# Patient Record
Sex: MALE | Race: WHITE
[De-identification: names, ages, dates, MRNs, and addresses within clinical notes are randomized per-mention and may not be internally consistent; named-entity substitution may affect disease eponyms.]

---

## 2018-07-16 ENCOUNTER — HOSPITAL ENCOUNTER (OUTPATIENT)
Dept: HOSPITAL 80 - FIMAGING | Age: 68
End: 2018-07-16
Attending: NURSE PRACTITIONER
Payer: COMMERCIAL

## 2018-07-16 DIAGNOSIS — N13.2: Primary | ICD-10-CM

## 2018-07-16 DIAGNOSIS — K76.89: ICD-10-CM

## 2018-07-16 DIAGNOSIS — K74.60: ICD-10-CM

## 2018-07-16 DIAGNOSIS — N20.0: ICD-10-CM

## 2018-07-16 PROCEDURE — 74178 CT ABD&PLV WO CNTR FLWD CNTR: CPT

## 2018-10-10 NOTE — GHP
DATE OF ADMISSION:  10/11/2018



ADMITTING DIAGNOSIS:  Right large ureteral calculus.



HISTORY OF PRESENT ILLNESS:  This is a 68-year-old gentleman who has had a large right ureteral calcu
prasanna noted on CAT scan with hydronephrosis, and the stone is 2.2 cm in longitudinal length.  At the pr
esent time, we discussed the various options of therapy and recommended that he undergo robotic urete
rolithotomy, and indications, complications, and risks were discussed.



PAST MEDICAL HISTORY:  He has had ureterolithiasis, hepatitis C, emphysema, alcoholism.



PAST SURGICAL HISTORY:  No surgeries identified.



MEDICATIONS:  Atrovent, ProAir.



ALLERGIES:  None.



FAMILY HISTORY:  Positive for kidney stones.



SOCIAL HISTORY:  Rare alcohol consumption.  Currently smokes daily.



REVIEW OF SYSTEMS:  Negative cardiac, respiratory, GI, and endocrine.



PHYSICAL EXAMINATION:  VITAL SIGNS:  Stable.  CHEST:  Clear.  HEART:  Regular rate and rhythm.  ABDOM
EN:  Normal.  No organomegaly, rebound, or guarding.  LOWER EXTREMITIES:  Normal.



ASSESSMENT AND PLAN:  At the present time, he is admitted for the right robotic-assisted ureterolitho
yudith.  We have asked that he have a cardiac clearance, and, by Dr. Mallory as of 08/17/2018, it says h
jonathan has no cardiac contraindication proceeding with necessary noncardiac surgery.  So plans are a right
-sided ureterolithotomy, robotically assisted.





Job #:  [id number]

## 2018-10-11 ENCOUNTER — HOSPITAL ENCOUNTER (INPATIENT)
Dept: HOSPITAL 80 - F1N | Age: 68
LOS: 4 days | Discharge: HOME | DRG: 661 | End: 2018-10-15
Attending: SPECIALIST | Admitting: SPECIALIST
Payer: COMMERCIAL

## 2018-10-11 DIAGNOSIS — Z72.0: ICD-10-CM

## 2018-10-11 DIAGNOSIS — N20.1: Primary | ICD-10-CM

## 2018-10-11 DIAGNOSIS — I10: ICD-10-CM

## 2018-10-11 DIAGNOSIS — K59.00: ICD-10-CM

## 2018-10-11 DIAGNOSIS — J43.9: ICD-10-CM

## 2018-10-11 DIAGNOSIS — F10.21: ICD-10-CM

## 2018-10-11 DIAGNOSIS — B19.20: ICD-10-CM

## 2018-10-11 PROCEDURE — C2625 STENT, NON-COR, TEM W/DEL SY: HCPCS

## 2018-10-11 RX ADMIN — OXYCODONE HYDROCHLORIDE PRN MG: 15 TABLET ORAL at 23:15

## 2018-10-11 RX ADMIN — OXYCODONE HYDROCHLORIDE PRN MG: 15 TABLET ORAL at 11:53

## 2018-10-11 RX ADMIN — DEXTROSE MONOHYDRATE AND SODIUM CHLORIDE SCH MLS: 5; .45 INJECTION, SOLUTION INTRAVENOUS at 21:14

## 2018-10-11 RX ADMIN — DEXTROSE MONOHYDRATE AND SODIUM CHLORIDE SCH MLS: 5; .45 INJECTION, SOLUTION INTRAVENOUS at 11:17

## 2018-10-11 RX ADMIN — ONDANSETRON PRN MG: 2 SOLUTION INTRAMUSCULAR; INTRAVENOUS at 13:08

## 2018-10-11 NOTE — POSTOPPROG
Post Op Note


Date of Operation: 10/11/18 (dictated)


Surgeon: Priyank Lundberg


Assistant: Elena


Anesthesiologist: Nick


Anesthesia: GET(General Endotracheal)


Pre-op Diagnosis: rt ureterolithiasis


Procedure: RA ureterolithotomy / stent


Inf/Abcess present in the surg proc area at time of surgery?: No


EBL: Minimal


Drains: Miguel Bailey, Other (ureteral stent)


Specimen(s): 





stone

## 2018-10-11 NOTE — PDANEPAE
ANE Past Medical History





- Cardiovascular History


Hx Hypertension: Yes


Hx Arrhythmias: No


Hx Chest Pain: No


Hx Coronary Artery / Peripheral Vascular Disease: Yes


Hx CHF / Valvular Disease: No


Hx Palpitations: No


Cardiovascular History Comment: borderline HTN - no current tx per cardiology





- Pulmonary History


Hx COPD: Yes


Hx Asthma/Reactive Airway Disease: No


Hx Recent Upper Respiratory Infection: No


Hx Oxygen in Use at Home: No


Hx Sleep Apnea: No


Sleep Apnea Screening Result - Last Documented: Negative


Pulmonary History Comment: emphysema





- Neurologic History


Hx Cerebrovascular Accident: No


Hx Seizures: No


Hx Dementia: No





- Endocrine History


Hx Diabetes: No





- Renal History


Hx Renal Disorders: Yes


Renal History Comment: renal stones





- Liver History


Hx Hepatic Disorders: Yes


Hepatic History Comment: hepatitis C, cirrhosis





- Neurological & Psychiatric Hx


Hx Neurological and Psychiatric Disorders: No





- Cancer History


Hx Cancer: No


Cancer History Comment: has had spots on lungs in the past but no diagnosis





- Congenital Disorder History


Hx Congenital Disorders: No





- GI History


Hx Gastrointestinal Disorders: No





- Other Health History


Other Health History: partial, top.  hx of alcoholism, sobor since 2014





- Chronic Pain History


Chronic Pain: No





- Surgical History


Prior Surgeries: none





ANE Review of Systems


Review of Systems: 








- Exercise capacity


METS (RN): 4 METS





ANE Patient History





- Allergies


Allergies/Adverse Reactions: 








No Known Allergies Allergy (Unverified 09/20/18 10:31)


 








- Home Medications


Home Medications: 








Albuterol [Proventil Inhaler HFA (*)] 1 - 2 puffs IH DAILY PRN 09/20/18 [Last 

Taken Unknown]


Herbals/Supplements -Info Only 1 ea PO DAILY 09/20/18 [Last Taken 10/04/18]


Ipratropium [Atrovent Hfa (*)] 2 puffs IH BID 09/20/18 [Last Taken 10/11/18 03:

30]


Multivitamins [Multivitamin (*)] 1 each PO DAILY 09/20/18 [Last Taken 10/04/18]








- NPO status


NPO Since - Liquids (Date): 10/10/18


NPO Since - Liquids (Time): 22:00


NPO Since - Solids (Date): 10/10/18


NPO Since - Solids (Time): 22:00





- Smoking Hx


Smoking Status: Current every day smoker





- Family Anes Hx


Family Hx Anesthesia Complications: none





ANE Labs/Vital Signs





- Vital Signs


Blood Pressure: 157/75


Heart Rate: 56


Respiratory Rate: 16


O2 Sat (%): 95


Height: 172.72 cm


Weight: 60.781 kg





ANE Physical Exam





- Airway


Neck exam: FROM


Mallampati Score: Class 1


Mouth exam: dentures





- Pulmonary


Pulmonary: no respiratory distress





- Cardiovascular


Cardiovascular: regular rate and rhythym





- ASA Status


ASA Status: III





ANE Anesthesia Plan


Anesthesia Plan: general endotracheal anesthesia

## 2018-10-11 NOTE — GOP
DATE OF OPERATION:  10/11/2018



SURGEON:  Priyank Lundberg MD



ASSISTANT:  Justyna Parker CFA



PREOPERATIVE DIAGNOSIS:  Right proximal ureteral calculus with obstruction.



POSTOPERATIVE DIAGNOSIS:  Right proximal ureteral calculus with obstruction.



PROCEDURE PERFORMED:  Robotic-assisted ureteral lithotomy and stent placement.



FINDINGS:  



SPECIMENS:  Stone.



ESTIMATED BLOOD LOSS:  Less than 10 mL.



DESCRIPTION OF PROCEDURE:  After undergoing general anesthesia, being prepped and draped in normal st
erile fashion in the left lateral decubitus position, appropriate time-out, appropriate identificatio
n of the stone and review of the CAT scan intraoperatively, a Veress needle was placed supraumbilical
ly and inflated to 15 mmHg pressure CO2 was utilized.  Then at that point, the 12 camera port was quintin
esther supraumbilically under direct vision and then two 8 mm robot arm ports, and a 12 mm assistant por
t were placed.  Then with appropriate instrumentation, the right colon was taken down.  Hemostasis vi
a bipolar electrocautery.  We then identified the ureter and dissected it out.  The gonadal vessel cr
ossed anterior to the ureter, and above that crossing vessel, I could identify the stone in the urete
r and that was dissected up to just above the level of the inferior pole of the kidney.  Ureterotomy 
was made.  The stone delivered out.  A stent, 4.7 multi-length was placed and it felt like it curled 
in the bladder and it curled in the kidney.  Then the ureter ureterotomy was approximated with 4-0 Vi
cryl suture.  It appeared to be water tight.  Reperitonealized the colon after placing a drain.  Then
 the 12 mm ports were closed with a fascial sutures with the closure device and 0 Vicryl.  The skin w
as approximated with 4-0 Monocryl and a local field injection was placed.



COMPLICATIONS:  None.



DISPOSITION:  He will be admitted for postoperative care.





Job #:  [id number]

## 2018-10-12 RX ADMIN — DEXTROSE MONOHYDRATE AND SODIUM CHLORIDE SCH MLS: 5; .45 INJECTION, SOLUTION INTRAVENOUS at 16:48

## 2018-10-12 RX ADMIN — ONDANSETRON PRN MG: 2 SOLUTION INTRAMUSCULAR; INTRAVENOUS at 19:45

## 2018-10-12 RX ADMIN — OXYCODONE HYDROCHLORIDE PRN MG: 15 TABLET ORAL at 19:51

## 2018-10-12 RX ADMIN — ACETAMINOPHEN PRN MG: 325 TABLET ORAL at 21:44

## 2018-10-12 RX ADMIN — ZOLPIDEM TARTRATE PRN MG: 5 TABLET ORAL at 21:43

## 2018-10-12 NOTE — SOAPPROG
SOAP Progress Note


Assessment/Plan: 


Assessment:








post robotic ureterolithiasis

















Plan:


Patient has fatigue and not ready for discharge. Some abdominal discharge and 

recommend he ambulate.  Discussed with RN. 


10/12/18 16:13





Subjective: 





fatigue and pain


Objective: 





 Vital Signs











Temp Pulse Resp BP Pulse Ox


 


 37.4 C   66   14   163/84 H  90 L


 


 10/12/18 15:02  10/12/18 15:02  10/12/18 15:02  10/12/18 15:02  10/12/18 15:02








 











 10/11/18 10/12/18 10/13/18





 05:59 05:59 05:59


 


Intake Total  12144 


 


Output Total  2188 160


 


Balance  02317 -160














Physical Exam





- Physical Exam


General Appearance: alert, no apparent distress


Respiratory: normal breath sounds


Cardiac/Chest: regular rate, rhythm


Abdomen: distended (bronwyn draining moderately bloody fluid)





ICD10 Worksheet


Patient Problems: 


 Problems











Problem Status Onset


 


Ureterolithiasis Acute  














- ICD10 Problem Qualifiers


(1) Ureterolithiasis

## 2018-10-12 NOTE — ASMTCMCOM
CM Note

 

CM Note                       

Notes:

Pt is a 67 y/o male who has a large right ureteral calculus noted on CAT scan with 

hydronephrosis.  Today he had a ureterolithomy right.  Pt has no identified CM needs; CM will 

follow for changes.



D/C Plan:  Anticipate independent.

 

Date Signed:  10/12/2018 03:10 PM

Electronically Signed By:Bhavna Foy

## 2018-10-12 NOTE — PDMN
Medical Necessity


Medical necessity: Change to IP, as of 10/12/18, per PA; los >2 mn s/p 

ureterolithiasis POD#1; pt having fatigue, pain & some abdominal discharge; 

requiring further monitoring & pain management

## 2018-10-13 LAB — PLATELET # BLD: 123 10^3/UL (ref 150–400)

## 2018-10-13 RX ADMIN — OXYCODONE HYDROCHLORIDE PRN MG: 15 TABLET ORAL at 17:07

## 2018-10-13 RX ADMIN — OXYCODONE HYDROCHLORIDE PRN MG: 15 TABLET ORAL at 21:13

## 2018-10-13 RX ADMIN — OXYCODONE HYDROCHLORIDE PRN MG: 15 TABLET ORAL at 08:10

## 2018-10-13 RX ADMIN — ACETAMINOPHEN PRN MG: 325 TABLET ORAL at 22:59

## 2018-10-13 RX ADMIN — OXYCODONE HYDROCHLORIDE PRN MG: 15 TABLET ORAL at 02:40

## 2018-10-13 RX ADMIN — OXYCODONE HYDROCHLORIDE PRN MG: 15 TABLET ORAL at 11:08

## 2018-10-13 NOTE — SOAPPROG
SOAP Progress Note


Assessment/Plan: 


Assessment: Ureterolithiasis   Acute POD # 2, assess labs and consider DC today

, labs ok








Plan: as noted








10/13/18 09:32





Subjective: 





constipated, and pain meds seem appropriate, pt refuses to ambulate even after 

discussion of risks of not ambulating


Objective: 





 Vital Signs











Temp Pulse Resp BP Pulse Ox


 


 37.1 C   72   15   140/80 H  95 


 


 10/13/18 08:00  10/13/18 08:00  10/13/18 08:00  10/13/18 08:00  10/13/18 08:00








 











 10/12/18 10/13/18 10/14/18





 05:59 05:59 05:59


 


Intake Total 22506 775 


 


Output Total 2186 3090 


 


Balance 21427 -2312 














Physical Exam





- Physical Exam


General Appearance: alert


EENT: other (pin point pupils from narcotics)


Respiratory: No respiratory distress


Cardiac/Chest: regular rate, rhythm


Abdomen: soft, distended, No hernia


Extremities: No calf tenderness, No Tayler's sign


Neuro/Psych: alert, oriented x 3, other (a bit too much narcotic)





ICD10 Worksheet


Patient Problems: 


 Problems











Problem Status Onset


 


Ureterolithiasis Acute

## 2018-10-14 RX ADMIN — ACETAMINOPHEN PRN MG: 325 TABLET ORAL at 09:04

## 2018-10-14 RX ADMIN — OXYCODONE HYDROCHLORIDE PRN MG: 15 TABLET ORAL at 02:25

## 2018-10-14 RX ADMIN — ACETAMINOPHEN PRN MG: 325 TABLET ORAL at 22:08

## 2018-10-14 NOTE — SOAPPROG
SOAP Progress Note


Assessment/Plan: 


Assessment: Ureterolithiasis   Acute POD # 3, assess labs and DC today, yet 

patient is reluctant to go due to no ride, consider SNF








Plan: as noted





10/14/18 09:34





Subjective: 





improving yet not having a ride for DC


Objective: 





 Vital Signs











Temp Pulse Resp BP Pulse Ox


 


 37.1 C   61   18   130/65 H  92 


 


 10/14/18 04:27  10/14/18 04:27  10/14/18 04:27  10/14/18 04:27  10/14/18 04:27








 Laboratory Results





 10/13/18 08:02 





 10/13/18 08:02 





 











 10/13/18 10/14/18 10/15/18





 05:59 05:59 05:59


 


Intake Total 775 2611 


 


Output Total 3090 1470 


 


Balance -2315 1141 














Physical Exam





- Physical Exam


General Appearance: alert


Neck: full range of motion


Respiratory: No respiratory distress


Cardiac/Chest: regular rate, rhythm


Abdomen: soft


Back: No CVA tenderness


Skin: warm/dry


Extremities: No calf tenderness, No Tayler's sign


Neuro/Psych: alert, oriented x 3





ICD10 Worksheet


Patient Problems: 


 Problems











Problem Status Onset


 


Ureterolithiasis Acute

## 2018-10-15 VITALS — DIASTOLIC BLOOD PRESSURE: 65 MMHG | SYSTOLIC BLOOD PRESSURE: 131 MMHG

## 2018-10-15 RX ADMIN — ZOLPIDEM TARTRATE PRN MG: 5 TABLET ORAL at 01:09

## 2018-10-15 NOTE — ASDISCHSUM
----------------------------------------------

Discharge Information

----------------------------------------------

Plan Status:Home with No Needs                       Medically Cleared to Leave:

Discharge Date:                                      CM D/C Disposition:Home, Routine, Self-Care

ADT D/C Disposition:Home, Routine, Self-Care         Projected Discharge Date:

Transportation at D/C:                               Discharge Delay Reason:

Follow-Up Date:                                      Discharge Slot:

Final Diagnosis:

----------------------------------------------

Placement Information

----------------------------------------------

----------------------------------------------

Patient Contact Information

----------------------------------------------

Contact Name:ANNIKA                          Relationship:Sister

Address:                                             Home [phone]

                                                     Work Phone:

City:                                                [facility] Phone:

State/Zip Code:                                      Email:

----------------------------------------------

Financial Information

----------------------------------------------

Financial Class:Medicare

Primary Plan Desc:MEDICARE INPATIENT                 Primary Plan Number:[id number]

Secondary Plan Desc:[employer]      Secondary Plan Number:[id number]

 

 

----------------------------------------------

Assessment Information

----------------------------------------------

----------------------------------------------

LACE

----------------------------------------------

LACE

 

Length of stay for            Answers:  4-6 days                              

current admission                                                             

Acuity / Level of             Answers:  Yes                                   

Care: Did the patient                                                         

have an inpatient                                                             

admission?                                                                    

Comorbidities - select        Answers:  Chronic pulmonary disease             

all that apply                                                                

                                        Coronary Artery Disease               

                                        Other                         Notes:  HTN; Hep C

# of Emergency department     Answers:  0                                     

visits in the last 6                                                          

months                                                                        

Social determinants           Answers:  History of substance                  

                                        abuse (ETOH, street                   

                                        drugs, prescription                   

                                        drugs, etc.)                          

Score: 15

 

Date Signed:  10/15/2018 08:33 AM

Electronically Signed By:Elaine Schultz

 

 

----------------------------------------------

[facility] CM Progress Note

----------------------------------------------

CM Note

 

CM Note                       

Notes:

Pt is a 67 y/o male who has a large right ureteral calculus noted on CAT scan with 

hydronephrosis.  Today he had a ureterolithomy right.  Pt has no identified CM needs; CM will 

follow for changes.



D/C Plan:  Anticipate independent.

 

Date Signed:  10/12/2018 03:10 PM

Electronically Signed By:Bhavna Foy

 

 

----------------------------------------------

LACE

----------------------------------------------

LACE

 

Length of stay for            Answers:  2 days                                

current admission                                                             

Acuity / Level of             Answers:  Yes                                   

Care: Did the patient                                                         

have an inpatient                                                             

admission?                                                                    

Comorbidities - select        Answers:  Chronic pulmonary disease             

all that apply                                                                

                                        Coronary Artery Disease               

                                        Other                         Notes:  HTN; Hep C

# of Emergency department     Answers:  0                                     

visits in the last 6                                                          

months                                                                        

Social determinants           Answers:  History of substance                  

                                        abuse (ETOH, street                   

                                        drugs, prescription                   

                                        drugs, etc.)                          

Score: 13

 

Date Signed:  10/15/2018 10:22 AM

Electronically Signed By:Bhavna Foy

 

 

----------------------------------------------

Intervention Information

----------------------------------------------

Intervention Type:*MURILLO-Signed                       Date of Service:10/12/2018 11:52 AM

Patient Type:Observation                             Staff Member:Lali Barger

Hours:                                               Discipline:

Severity:                                            Comment:

Intervention Type:*IM-Signed                         Date of Service:10/15/2018 10:09 AM

Patient Type:Inpatient                               Staff Member:Elaine Schultz

Hours:                                               Discipline:

Severity:                                            Comment:

## 2018-10-15 NOTE — ASMTLACE
LACE

 

Length of stay for            Answers:  2 days                                

current admission                                                             

Acuity / Level of             Answers:  Yes                                   

Care: Did the patient                                                         

have an inpatient                                                             

admission?                                                                    

Comorbidities - select        Answers:  Chronic pulmonary disease             

all that apply                                                                

                                        Coronary Artery Disease               

                                        Other                         Notes:  HTN; Hep C

# of Emergency department     Answers:  0                                     

visits in the last 6                                                          

months                                                                        

Social determinants           Answers:  History of substance                  

                                        abuse (ETOH, street                   

                                        drugs, prescription                   

                                        drugs, etc.)                          

Score: 13

 

Date Signed:  10/15/2018 10:22 AM

Electronically Signed By:Bhavna Foy

## 2018-10-15 NOTE — ASMTLACE
LACE

 

Length of stay for            Answers:  4-6 days                              

current admission                                                             

Acuity / Level of             Answers:  Yes                                   

Care: Did the patient                                                         

have an inpatient                                                             

admission?                                                                    

Comorbidities - select        Answers:  Chronic pulmonary disease             

all that apply                                                                

                                        Coronary Artery Disease               

                                        Other                         Notes:  HTN; Hep C

# of Emergency department     Answers:  0                                     

visits in the last 6                                                          

months                                                                        

Social determinants           Answers:  History of substance                  

                                        abuse (ETOH, street                   

                                        drugs, prescription                   

                                        drugs, etc.)                          

Score: 15

 

Date Signed:  10/15/2018 08:33 AM

Electronically Signed By:Elaine Schultz

## 2018-12-27 ENCOUNTER — HOSPITAL ENCOUNTER (OUTPATIENT)
Dept: HOSPITAL 80 - FIMAGING | Age: 68
End: 2018-12-27
Attending: PHYSICIAN ASSISTANT
Payer: COMMERCIAL

## 2018-12-27 DIAGNOSIS — N13.30: Primary | ICD-10-CM

## 2018-12-27 PROCEDURE — 78708 K FLOW/FUNCT IMAGE W/DRUG: CPT

## 2018-12-27 PROCEDURE — A9562 TC99M MERTIATIDE: HCPCS

## 2019-02-26 ENCOUNTER — HOSPITAL ENCOUNTER (OUTPATIENT)
Dept: HOSPITAL 80 - FIMAGING | Age: 69
End: 2019-02-26
Attending: NURSE PRACTITIONER
Payer: COMMERCIAL

## 2019-02-26 DIAGNOSIS — N20.0: ICD-10-CM

## 2019-02-26 DIAGNOSIS — K80.20: ICD-10-CM

## 2019-02-26 DIAGNOSIS — R16.0: Primary | ICD-10-CM
